# Patient Record
Sex: FEMALE | Race: BLACK OR AFRICAN AMERICAN | Employment: FULL TIME | URBAN - METROPOLITAN AREA
[De-identification: names, ages, dates, MRNs, and addresses within clinical notes are randomized per-mention and may not be internally consistent; named-entity substitution may affect disease eponyms.]

---

## 2021-04-15 ENCOUNTER — HOSPITAL ENCOUNTER (EMERGENCY)
Facility: CLINIC | Age: 46
Discharge: HOME | End: 2021-04-15
Attending: EMERGENCY MEDICINE | Admitting: EMERGENCY MEDICINE
Payer: COMMERCIAL

## 2021-04-15 VITALS
WEIGHT: 165 LBS | OXYGEN SATURATION: 98 % | TEMPERATURE: 98.2 F | SYSTOLIC BLOOD PRESSURE: 123 MMHG | RESPIRATION RATE: 16 BRPM | HEART RATE: 84 BPM | DIASTOLIC BLOOD PRESSURE: 79 MMHG | BODY MASS INDEX: 23.01 KG/M2

## 2021-04-15 DIAGNOSIS — R42 LIGHTHEADEDNESS: ICD-10-CM

## 2021-04-15 DIAGNOSIS — D64.9 SYMPTOMATIC ANEMIA: ICD-10-CM

## 2021-04-15 LAB
ALBUMIN SERPL-MCNC: 3.7 G/DL (ref 3.4–5)
ALP SERPL-CCNC: 49 U/L (ref 40–150)
ALT SERPL W P-5'-P-CCNC: 19 U/L (ref 0–50)
ANION GAP SERPL CALCULATED.3IONS-SCNC: <1 MMOL/L (ref 3–14)
AST SERPL W P-5'-P-CCNC: 13 U/L (ref 0–45)
BASOPHILS # BLD AUTO: 0 10E9/L (ref 0–0.2)
BASOPHILS NFR BLD AUTO: 0 %
BILIRUB SERPL-MCNC: 0.5 MG/DL (ref 0.2–1.3)
BUN SERPL-MCNC: 8 MG/DL (ref 7–30)
CALCIUM SERPL-MCNC: 8.4 MG/DL (ref 8.5–10.1)
CHLORIDE SERPL-SCNC: 109 MMOL/L (ref 94–109)
CO2 SERPL-SCNC: 30 MMOL/L (ref 20–32)
CREAT SERPL-MCNC: 0.59 MG/DL (ref 0.52–1.04)
DIFFERENTIAL METHOD BLD: ABNORMAL
EOSINOPHIL # BLD AUTO: 0.1 10E9/L (ref 0–0.7)
EOSINOPHIL NFR BLD AUTO: 4 %
ERYTHROCYTE [DISTWIDTH] IN BLOOD BY AUTOMATED COUNT: 17.5 % (ref 10–15)
GFR SERPL CREATININE-BSD FRML MDRD: >90 ML/MIN/{1.73_M2}
GLUCOSE SERPL-MCNC: 119 MG/DL (ref 70–99)
HCT VFR BLD AUTO: 29.1 % (ref 35–47)
HGB BLD-MCNC: 9 G/DL (ref 11.7–15.7)
INTERPRETATION ECG - MUSE: NORMAL
LYMPHOCYTES # BLD AUTO: 1.9 10E9/L (ref 0.8–5.3)
LYMPHOCYTES NFR BLD AUTO: 59 %
MCH RBC QN AUTO: 25 PG (ref 26.5–33)
MCHC RBC AUTO-ENTMCNC: 30.9 G/DL (ref 31.5–36.5)
MCV RBC AUTO: 81 FL (ref 78–100)
MONOCYTES # BLD AUTO: 0.3 10E9/L (ref 0–1.3)
MONOCYTES NFR BLD AUTO: 10 %
NEUTROPHILS # BLD AUTO: 0.9 10E9/L (ref 1.6–8.3)
NEUTROPHILS NFR BLD AUTO: 27 %
PLATELET # BLD AUTO: 231 10E9/L (ref 150–450)
PLATELET # BLD EST: ABNORMAL 10*3/UL
POTASSIUM SERPL-SCNC: 3.5 MMOL/L (ref 3.4–5.3)
PROT SERPL-MCNC: 7.3 G/DL (ref 6.8–8.8)
RBC # BLD AUTO: 3.6 10E12/L (ref 3.8–5.2)
RBC MORPH BLD: ABNORMAL
SODIUM SERPL-SCNC: 139 MMOL/L (ref 133–144)
TROPONIN I SERPL-MCNC: <0.015 UG/L (ref 0–0.04)
WBC # BLD AUTO: 3.2 10E9/L (ref 4–11)

## 2021-04-15 PROCEDURE — 258N000003 HC RX IP 258 OP 636: Performed by: EMERGENCY MEDICINE

## 2021-04-15 PROCEDURE — 93005 ELECTROCARDIOGRAM TRACING: CPT

## 2021-04-15 PROCEDURE — 99284 EMERGENCY DEPT VISIT MOD MDM: CPT | Mod: 25

## 2021-04-15 PROCEDURE — 80053 COMPREHEN METABOLIC PANEL: CPT | Performed by: EMERGENCY MEDICINE

## 2021-04-15 PROCEDURE — 85025 COMPLETE CBC W/AUTO DIFF WBC: CPT | Performed by: EMERGENCY MEDICINE

## 2021-04-15 PROCEDURE — 84484 ASSAY OF TROPONIN QUANT: CPT | Performed by: EMERGENCY MEDICINE

## 2021-04-15 PROCEDURE — 96360 HYDRATION IV INFUSION INIT: CPT

## 2021-04-15 RX ORDER — FERROUS SULFATE 325(65) MG
325 TABLET, DELAYED RELEASE (ENTERIC COATED) ORAL DAILY
Qty: 30 TABLET | Refills: 0 | Status: SHIPPED | OUTPATIENT
Start: 2021-04-15 | End: 2021-09-29

## 2021-04-15 RX ADMIN — SODIUM CHLORIDE 1000 ML: 9 INJECTION, SOLUTION INTRAVENOUS at 21:14

## 2021-04-15 ASSESSMENT — ENCOUNTER SYMPTOMS
BLOOD IN STOOL: 0
LIGHT-HEADEDNESS: 1
NAUSEA: 1
FATIGUE: 1
ABDOMINAL PAIN: 1

## 2021-04-15 NOTE — LETTER
April 15, 2021      To Whom It May Concern:      Steve Zurita was seen in our Emergency Department today, 04/15/21.  I expect her condition to improve over the next 2 days.  She may return to work/school when improved.    Sincerely,        Lester Carroll, DO

## 2021-04-16 NOTE — DISCHARGE INSTRUCTIONS
Take iron supplement daily as prescribed  Recommend MiraLAX or daily stool softener to prevent constipation  Maintain adequate hydration

## 2021-04-16 NOTE — ED PROVIDER NOTES
History     Chief Complaint:  Fatigue and light headedness       HPI  Steve Zurita is a 45 year old female  who presents for evaluation of fatigue and light headedness. The patient reports that she got her menstrual cycle last Sunday (4/4/21) and ended on 4/11/21. Her menstrual cycle is typically regular and heavy. Her last menstrual cycle put her out of work from her symptoms. Two days ago the patient became light headed with associated fatigue and nausea. She also notes that she has some abdominal pain. She denies any black or tarry stools, blood in stool, swelling in her legs, taking any blood thinners or iron supplements. The patient denies any concern for pregnancy.      Review of Systems   Constitutional: Positive for fatigue.   Cardiovascular: Negative for leg swelling.   Gastrointestinal: Positive for abdominal pain and nausea. Negative for blood in stool.        No black or tarry stools   Neurological: Positive for light-headedness.   All other systems reviewed and are negative.      Allergies:  No Known Drug Allergies    Medications:   Albuterol  Flagyl     Medical History:   Mild intermittent asthma  Anemia  Hematochezia    Past Surgical History:    ENT surgery   Gyn surgery     Social History:  The patient was unaccompanied to the ED.     Physical Exam     Patient Vitals for the past 24 hrs:   BP Temp Temp src Pulse Resp SpO2 Weight   04/15/21 1936 123/79 98.2  F (36.8  C) Temporal 84 16 98 % 74.8 kg (165 lb)      Physical Exam   General: Alert and cooperative with exam. Patient in mild distress. Normal mentation.  Head:  Scalp is NC/AT  Eyes:  No scleral icterus, PERRL  ENT:  The external nose and ears are normal. The oropharynx is normal and without erythema; mucus membranes are moist. Uvula midline, no evidence of deep space infection.  Neck:  Normal range of motion without rigidity.  CV:  Regular rate and rhythm    No pathologic murmur   Resp:  Breath sounds are clear  bilaterally    Non-labored, no retractions or accessory muscle use  GI:  Abdomen is soft, no distension, no tenderness. No peritoneal signs  MS:  No lower extremity edema   Skin:  Warm and dry, No rash or lesions noted.  Neuro: Oriented x 3. No gross motor deficits.  Emergency Department Course   ECG  ECG taken at 1939, ECG read at 1950  Normal sinus rhythm  Normal ECG   Rate 80 bpm. MD interval 136 ms. QRS duration 84 ms. QT/QTc 362/417 ms. P-R-T axes 77 63 41.     Laboratory:  CBC: WBC 3.2 (L), HGB 9.0 (L),   CMP: glucose 119 (H), anion gap <1 (L), calcium 8.4 (L) o/w WNL (Creatinine 0.59)  Troponin (Collected 1948): <0.015    Emergency Department Course:  Reviewed:  2114 I reviewed nursing notes, vitals, past medical history and care everywhere    Assessments:  2145 I rechecked the patient and explained findings.     2201 Patient rechecked and updated.      Interventions:  2114 0.9% NS 1000 mL IV    Disposition:  The patient was discharged to home.   Impression & Plan   Medical Decision Making:  Steve Zurita is a 45 year old female who presents for evaluation of light headedness and fatigue.  They are anemic on labs.  A broad differential was of course considered, most probable etiologies include anemia of chronic disease, genetic disorder with abnormal hemoglobin, acute blood loss, slow or fast gastrointestinal bleeding, iron deficiency anemia, etc.  Given exam, history and labs, I feel the most likely etiology of her anemia is reportedly heavy periods.  Patient has no current ongoing vaginal bleeding.  Lungs are clear to auscultation there is no evidence of infectious process.  EKG shows normal sinus rhythm and remainder of labs are essentially unremarkable as noted above.    Based on hgb, I recommended outpatient management, iron supplementation (prescription provided), and close follow up with primary. Return for worsening symptoms, progressive dyspnea, blood in stools, vomiting blood, black  stools.      Diagnosis:     ICD-10-CM    1. Symptomatic anemia  D64.9    2. Lightheadedness  R42         Discharge Medications:  New Prescriptions    FERROUS SULFATE (FE TABS) 325 (65 FE) MG EC TABLET    Take 1 tablet (325 mg) by mouth daily       Scribe Disclosure:  I, Sasha Early, am serving as a scribe at 9:14 PM on 4/15/2021 to document services personally performed by Lester Howard DO based on my observations and the provider's statements to me.     [facility] Lester Aguilar DO  04/15/21 [id number]

## 2021-04-16 NOTE — ED TRIAGE NOTES
Pt has had intermittent chest pain intermittently throughout the last 3 weeks.  States that it feels sharp.

## 2021-07-21 ENCOUNTER — OFFICE VISIT (OUTPATIENT)
Dept: URGENT CARE | Facility: URGENT CARE | Age: 46
End: 2021-07-21
Payer: COMMERCIAL

## 2021-07-21 VITALS
TEMPERATURE: 97 F | HEART RATE: 88 BPM | DIASTOLIC BLOOD PRESSURE: 73 MMHG | WEIGHT: 170 LBS | SYSTOLIC BLOOD PRESSURE: 122 MMHG | OXYGEN SATURATION: 99 % | BODY MASS INDEX: 23.71 KG/M2

## 2021-07-21 DIAGNOSIS — N89.8 VAGINAL DISCHARGE: Primary | ICD-10-CM

## 2021-07-21 DIAGNOSIS — N76.0 BV (BACTERIAL VAGINOSIS): ICD-10-CM

## 2021-07-21 DIAGNOSIS — B96.89 BV (BACTERIAL VAGINOSIS): ICD-10-CM

## 2021-07-21 DIAGNOSIS — B37.31 CANDIDIASIS OF VAGINA: ICD-10-CM

## 2021-07-21 LAB
ALBUMIN UR-MCNC: NEGATIVE MG/DL
APPEARANCE UR: CLEAR
BACTERIA #/AREA URNS HPF: ABNORMAL /HPF
BILIRUB UR QL STRIP: NEGATIVE
CLUE CELLS: PRESENT
COLOR UR AUTO: YELLOW
GLUCOSE UR STRIP-MCNC: NEGATIVE MG/DL
HGB UR QL STRIP: NEGATIVE
KETONES UR STRIP-MCNC: ABNORMAL MG/DL
LEUKOCYTE ESTERASE UR QL STRIP: ABNORMAL
NITRATE UR QL: NEGATIVE
PH UR STRIP: 6.5 [PH] (ref 5–7)
RBC #/AREA URNS AUTO: ABNORMAL /HPF
SP GR UR STRIP: >=1.03 (ref 1–1.03)
TRICHOMONAS, WET PREP: ABNORMAL
UROBILINOGEN UR STRIP-ACNC: 2 E.U./DL
WBC #/AREA URNS AUTO: ABNORMAL /HPF
WBC'S/HIGH POWER FIELD, WET PREP: ABNORMAL
YEAST, WET PREP: PRESENT

## 2021-07-21 PROCEDURE — 87210 SMEAR WET MOUNT SALINE/INK: CPT

## 2021-07-21 PROCEDURE — 99203 OFFICE O/P NEW LOW 30 MIN: CPT | Performed by: NURSE PRACTITIONER

## 2021-07-21 PROCEDURE — 81001 URINALYSIS AUTO W/SCOPE: CPT

## 2021-07-21 RX ORDER — METRONIDAZOLE 500 MG/1
500 TABLET ORAL 2 TIMES DAILY
Qty: 14 TABLET | Refills: 0 | Status: SHIPPED | OUTPATIENT
Start: 2021-07-21 | End: 2021-07-28

## 2021-07-21 RX ORDER — FLUCONAZOLE 150 MG/1
150 TABLET ORAL ONCE
Qty: 1 TABLET | Refills: 1 | Status: SHIPPED | OUTPATIENT
Start: 2021-07-21 | End: 2021-07-21

## 2021-07-22 NOTE — PATIENT INSTRUCTIONS
Patient Education     Bacterial Vaginosis    You have a vaginal infection called bacterial vaginosis (BV). Both good and bad bacteria are present in a healthy vagina. BV occurs when these bacteria get out of balance. The number of bad bacteria increase. And the number of good bacteria decrease. BV is linked with sexual activity, but it's not a sexually transmitted infection (STI).   BV may or may not cause symptoms. If symptoms do occur, they can include:     Thin, gray, milky-white, or sometimes green discharge    Unpleasant odor or  fishy  smell    Itching, burning, or pain in or around the vagina  It is not known what causes BV, but certain factors can make the problem more likely. These can include:     Douching    Spermicides    Use of antibiotics    Change in hormone levels with pregnancy, breastfeeding, or menopause    Having sex with a new partner    Having sex with more than one partner  BV will sometimes go away on its own. But treatment is often advised. This is because untreated BV can raise the risk of more serious health problems such as:     Pelvic inflammatory disease (PID)     delivery (giving birth to a baby early if you re pregnant)    HIV and some other sexually transmitted infections (STIs)    Infection after surgery on the reproductive organs  Home care  General care    BV is most often treated with medicines called antibiotics. These may be given as pills or as a vaginal cream. If antibiotics are prescribed, be sure to use them exactly as directed. And complete all of the medicine, even if your symptoms go away.    Don't douche or having sex during treatment.    If you have sex with a female partner, ask your healthcare provider if she should also be treated.  Prevention    Don't douche.    Don't have sex. If you do have sex, then take steps to lower your risk:  ? Use condoms when having sex.  ? Limit the number of sex partners you have.    Follow-up care  Follow up with your  healthcare provider, or as advised.   When to get medical advice  Call your healthcare provider right away if:     You have a fever of 100.4 F (38 C) or higher, or as directed by your provider.    Your symptoms get worse, or they don t go away within a few days of starting treatment.    You have new pain in the lower belly or pelvic region.    You have side effects that bother you or a reaction to the pills or cream you re prescribed.    You or any of your sex partners have new symptoms, such as a rash, joint pain, or sores.  [employer] last reviewed this educational content on 6/1/2020 2000-2021 [employer]. All rights reserved. This information is not intended as a substitute for professional medical care. Always follow your healthcare professional's instructions.           Patient Education     Yeast Infection (Candida Vaginal Infection)    You have a Candida vaginal infection. This is also known as a yeast infection. It's most often caused by a type of yeast (fungus) called Candida. Candida are normally found in the vagina. But if they increase in number, this can lead to infection and cause symptoms.   Symptoms of a yeast infection can include:     Clumpy or thin, white discharge, which may look like cottage cheese    Itching or burning    Burning with urination  Certain factors can make a yeast infection more likely. These can include:     Taking certain medicines, such as antibiotics or birth control pills    Pregnancy    Diabetes    Weak immune system  A yeast infection is most often treated with antifungal medicine. This may be given as a vaginal cream or pills you take by mouth. Treatment may last for about 1 to 7 days. Women with severe or recurrent infections may need longer courses of treatment.   Home care    If you re prescribed medicine, be sure to use it as directed. Finish all of the medicine, even if your symptoms go away. Don t try to treat yourself using over-the-counter products  without talking with your provider first. They will let you know if this is a good option for you.    Ask your provider what steps you can take to help reduce your risk of having a yeast infection in the future.    Follow-up care  Follow up with your healthcare provider, or as directed.   When to seek medical advice  Call your healthcare provider right away if:     You have a fever of 100.4 F (38 C) or higher, or as directed by your provider.    Your symptoms worsen, or they don t go away within a few days of starting treatment.    You have new pain in the lower belly or pelvic region.    You have side effects that bother you or a reaction to the cream or pills you re prescribed.    You or any partners you have sex with have new symptoms, such as a rash, joint pain, or sores.  [employer] last reviewed this educational content on 7/1/2020 2000-2021 [employer]. All rights reserved. This information is not intended as a substitute for professional medical care. Always follow your healthcare professional's instructions.

## 2021-07-22 NOTE — PROGRESS NOTES
Assessment & Plan     Vaginal discharge    - UA Macro with Reflex to Micro and Culture - lab collect  - Wet prep - lab collect  - Urine Microscopic    BV (bacterial vaginosis)    - metroNIDAZOLE (FLAGYL) 500 MG tablet; Take 1 tablet (500 mg) by mouth 2 times daily for 7 days    Candidiasis of vagina    - fluconazole (DIFLUCAN) 150 MG tablet; Take 1 tablet (150 mg) by mouth once for 1 dose      15 minutes spent on the date of the encounter doing review of test results, interpretation of tests, patient visit and documentation        See Patient Instructions    Return in about 1 week (around 7/28/2021), or if symptoms worsen or fail to improve.    Paulette Mendez, CNP  [facility] JANNET Wilson is a 45 year old who presents for the following health issues     HPI     Vaginal Symptoms  Onset/Duration: 1 week  Description:  Vaginal Discharge: white   Itching (Pruritis): YES  Burning sensation:  YES  Odor: no  Accompanying Signs & Symptoms:  Urinary symptoms: no  Abdominal pain: no  Fever: no  History:   Sexually active: YES  New Partner: YES  Possibility of Pregnancy:  no  Recent antibiotic use: no  Previous vaginitis issues: no  Precipitating or alleviating factors: None  Therapies tried and outcome: none          Review of Systems   Constitutional, HEENT, cardiovascular, pulmonary, gi and gu systems are negative, except as otherwise noted.      Objective    /73 (BP Location: Left arm, Patient Position: Sitting, Cuff Size: Adult Regular)   Pulse 88   Temp 97  F (36.1  C) (Tympanic)   Wt 77.1 kg (170 lb)   SpO2 99%   Breastfeeding No   BMI 23.71 kg/m    Body mass index is 23.71 kg/m .  Physical Exam   GENERAL: healthy, alert and no distress  NECK: no adenopathy, no asymmetry, masses, or scars and thyroid normal to palpation  RESP: lungs clear to auscultation - no rales, rhonchi or wheezes  CV: regular rate and rhythm, normal S1 S2, no S3 or S4, no murmur, click or  rub, no peripheral edema and peripheral pulses strong  ABDOMEN: soft, nontender, no hepatosplenomegaly, no masses and bowel sounds normal  MS: no gross musculoskeletal defects noted, no edema    Results for orders placed or performed in visit on 07/21/21   UA Macro with Reflex to Micro and Culture - lab collect     Status: Abnormal    Specimen: Urine, Midstream   Result Value Ref Range    Color Urine Yellow Colorless, Straw, Light Yellow, Yellow    Appearance Urine Clear Clear    Glucose Urine Negative Negative mg/dL    Bilirubin Urine Negative Negative    Ketones Urine Trace (A) Negative mg/dL    Specific Gravity Urine >=1.030 1.003 - 1.035    Blood Urine Negative Negative    pH Urine 6.5 5.0 - 7.0    Protein Albumin Urine Negative Negative mg/dL    Urobilinogen Urine 2.0 (A) 0.2, 1.0 E.U./dL    Nitrite Urine Negative Negative    Leukocyte Esterase Urine Small (A) Negative   Urine Microscopic     Status: Abnormal   Result Value Ref Range    Bacteria Urine Many (A) None Seen /HPF    RBC Urine 0-2 0-2 /HPF /HPF    WBC Urine 0-5 0-5 /HPF /HPF    Narrative    Urine Culture not indicated   Wet prep - lab collect     Status: Abnormal    Specimen: Vagina; Swab   Result Value Ref Range    Trichomonas Absent Absent    Yeast Present (A) Absent    Clue Cells Present (A) Absent    WBCs/high power field 4+ (A) None

## 2021-08-06 ENCOUNTER — OFFICE VISIT (OUTPATIENT)
Dept: URGENT CARE | Facility: URGENT CARE | Age: 46
End: 2021-08-06
Payer: COMMERCIAL

## 2021-08-06 VITALS
OXYGEN SATURATION: 99 % | HEART RATE: 87 BPM | BODY MASS INDEX: 23.57 KG/M2 | DIASTOLIC BLOOD PRESSURE: 85 MMHG | SYSTOLIC BLOOD PRESSURE: 124 MMHG | WEIGHT: 169 LBS | TEMPERATURE: 98.4 F

## 2021-08-06 DIAGNOSIS — N76.0 BACTERIAL VAGINITIS: ICD-10-CM

## 2021-08-06 DIAGNOSIS — B96.89 BACTERIAL VAGINITIS: ICD-10-CM

## 2021-08-06 DIAGNOSIS — B37.31 CANDIDIASIS OF VAGINA: ICD-10-CM

## 2021-08-06 DIAGNOSIS — N94.9 VAGINAL SYMPTOM: Primary | ICD-10-CM

## 2021-08-06 LAB
ALBUMIN UR-MCNC: NEGATIVE MG/DL
APPEARANCE UR: CLEAR
BACTERIA #/AREA URNS HPF: ABNORMAL /HPF
BILIRUB UR QL STRIP: NEGATIVE
CLUE CELLS: PRESENT
COLOR UR AUTO: YELLOW
GLUCOSE UR STRIP-MCNC: NEGATIVE MG/DL
HGB UR QL STRIP: NEGATIVE
KETONES UR STRIP-MCNC: ABNORMAL MG/DL
LEUKOCYTE ESTERASE UR QL STRIP: ABNORMAL
NITRATE UR QL: NEGATIVE
PH UR STRIP: 7 [PH] (ref 5–7)
RBC #/AREA URNS AUTO: ABNORMAL /HPF
SP GR UR STRIP: 1.02 (ref 1–1.03)
TRICHOMONAS, WET PREP: ABNORMAL
UROBILINOGEN UR STRIP-ACNC: 1 E.U./DL
WBC #/AREA URNS AUTO: ABNORMAL /HPF
WBC'S/HIGH POWER FIELD, WET PREP: ABNORMAL
YEAST, WET PREP: PRESENT

## 2021-08-06 PROCEDURE — 87210 SMEAR WET MOUNT SALINE/INK: CPT | Performed by: STUDENT IN AN ORGANIZED HEALTH CARE EDUCATION/TRAINING PROGRAM

## 2021-08-06 PROCEDURE — 81001 URINALYSIS AUTO W/SCOPE: CPT | Performed by: STUDENT IN AN ORGANIZED HEALTH CARE EDUCATION/TRAINING PROGRAM

## 2021-08-06 PROCEDURE — 87591 N.GONORRHOEAE DNA AMP PROB: CPT | Performed by: STUDENT IN AN ORGANIZED HEALTH CARE EDUCATION/TRAINING PROGRAM

## 2021-08-06 PROCEDURE — 87491 CHLMYD TRACH DNA AMP PROBE: CPT | Performed by: STUDENT IN AN ORGANIZED HEALTH CARE EDUCATION/TRAINING PROGRAM

## 2021-08-06 PROCEDURE — 99213 OFFICE O/P EST LOW 20 MIN: CPT | Performed by: STUDENT IN AN ORGANIZED HEALTH CARE EDUCATION/TRAINING PROGRAM

## 2021-08-06 RX ORDER — METRONIDAZOLE 500 MG/1
500 TABLET ORAL 2 TIMES DAILY
Qty: 14 TABLET | Refills: 0 | Status: SHIPPED | OUTPATIENT
Start: 2021-08-06 | End: 2021-08-13

## 2021-08-06 RX ORDER — FLUCONAZOLE 150 MG/1
150 TABLET ORAL DAILY
Qty: 3 TABLET | Refills: 0 | Status: SHIPPED | OUTPATIENT
Start: 2021-08-06 | End: 2021-08-09

## 2021-08-06 NOTE — PROGRESS NOTES
Assessment & Plan     Vaginal symptom  - UA reflex to Microscopic and Culture  - Wet preparation  - Neisseria gonorrhoeae PCR - Clinic Collect  - Chlamydia trachomatis PCR - Clinic Collect  - Urine Microscopic    Bacterial vaginitis  - metroNIDAZOLE (FLAGYL) 500 MG tablet  Dispense: 14 tablet; Refill: 0    Candidiasis of vagina  Suspect recurrent yeast infection in setting of PO antibiotics for BV. Wet prep still shows yeast and clue cells.  - fluconazole (DIFLUCAN) 150 MG tablet  Dispense: 3 tablet; Refill: 0        Return if symptoms worsen or fail to improve.    Opal Nunes MD  [facility] URGENT CARE JANNET Wilson is a 45 year old female who presents to clinic today for the following health issues:  Chief Complaint   Patient presents with     Urgent Care     Vaginal Problem     was seen and treated here on 7/21 for yeast infection, lots of itching. wants to be checked for std.     HPI    GYN Complaint  Vaginal itching with scant amount of discharge  Onset of symptoms was 5 day(s) ago.  Got better and then worse again - now itching is the biggest symptom and less vaginal discharge this time  Course of illness is worsening.    Severity moderate  Current and Associated symptoms: local irritation and vulvar itching  No rash  Treatment measures tried include: was treated for yeast infection with fluconazole x1 and treated for BC with metronidazole PO x 7d  Sexually active: yes  Predisposing factors: None  Hx of previous symptom: rare        Review of Systems  Constitutional,  gi and gu systems are negative, except as otherwise noted.      Objective    /85   Pulse 87   Temp 98.4  F (36.9  C) (Tympanic)   Wt 76.7 kg (169 lb)   SpO2 99%   BMI 23.57 kg/m    Physical Exam   GEN: Alert and appropriately interactive for age  EYES: Eyes grossly normal to inspection, conjunctivae and sclerae normal  RESP: Breathing comfortably on room air   CV: Warm and well perfused peripheral  extremities   MS: no gross musculoskeletal defects noted, no edema  NEURO: Alert and oriented x 4. Gait normal. Speech fluent.    PSYCH: Affect normal/bright. Appearance well groomed.       Results for orders placed or performed in visit on 08/06/21 (from the past 24 hour(s))   UA reflex to Microscopic and Culture    Specimen: Urine, Midstream   Result Value Ref Range    Color Urine Yellow Colorless, Straw, Light Yellow, Yellow    Appearance Urine Clear Clear    Glucose Urine Negative Negative mg/dL    Bilirubin Urine Negative Negative    Ketones Urine Trace (A) Negative mg/dL    Specific Gravity Urine 1.025 1.003 - 1.035    Blood Urine Negative Negative    pH Urine 7.0 5.0 - 7.0    Protein Albumin Urine Negative Negative mg/dL    Urobilinogen Urine 1.0 0.2, 1.0 E.U./dL    Nitrite Urine Negative Negative    Leukocyte Esterase Urine Small (A) Negative   Urine Microscopic   Result Value Ref Range    Bacteria Urine Many (A) None Seen /HPF    RBC Urine 0-2 0-2 /HPF /HPF    WBC Urine 0-5 0-5 /HPF /HPF    Narrative    Urine Culture not indicated   Wet preparation    Specimen: Vagina; Swab   Result Value Ref Range    Trichomonas Absent Absent    Yeast Present (A) Absent    Clue Cells Present (A) Absent    WBCs/high power field 4+ (A) None

## 2021-08-08 LAB
C TRACH DNA SPEC QL NAA+PROBE: NEGATIVE
N GONORRHOEA DNA SPEC QL NAA+PROBE: NEGATIVE

## 2021-08-29 ENCOUNTER — HEALTH MAINTENANCE LETTER (OUTPATIENT)
Age: 46
End: 2021-08-29

## 2021-09-29 ENCOUNTER — OFFICE VISIT (OUTPATIENT)
Dept: URGENT CARE | Facility: URGENT CARE | Age: 46
End: 2021-09-29
Payer: COMMERCIAL

## 2021-09-29 VITALS
HEART RATE: 64 BPM | DIASTOLIC BLOOD PRESSURE: 80 MMHG | OXYGEN SATURATION: 100 % | TEMPERATURE: 98.1 F | SYSTOLIC BLOOD PRESSURE: 123 MMHG

## 2021-09-29 DIAGNOSIS — N76.0 BV (BACTERIAL VAGINOSIS): ICD-10-CM

## 2021-09-29 DIAGNOSIS — B96.89 BV (BACTERIAL VAGINOSIS): ICD-10-CM

## 2021-09-29 DIAGNOSIS — Z11.3 SCREEN FOR STD (SEXUALLY TRANSMITTED DISEASE): Primary | ICD-10-CM

## 2021-09-29 LAB
CLUE CELLS: PRESENT
TRICHOMONAS, WET PREP: ABNORMAL
WBC'S/HIGH POWER FIELD, WET PREP: ABNORMAL
YEAST, WET PREP: ABNORMAL

## 2021-09-29 PROCEDURE — 87591 N.GONORRHOEAE DNA AMP PROB: CPT | Performed by: NURSE PRACTITIONER

## 2021-09-29 PROCEDURE — 99213 OFFICE O/P EST LOW 20 MIN: CPT

## 2021-09-29 PROCEDURE — 87210 SMEAR WET MOUNT SALINE/INK: CPT

## 2021-09-29 PROCEDURE — 87491 CHLMYD TRACH DNA AMP PROBE: CPT | Performed by: NURSE PRACTITIONER

## 2021-09-29 RX ORDER — CLINDAMYCIN PHOSPHATE 20 MG/G
CREAM VAGINAL AT BEDTIME
Qty: 40 G | Refills: 0 | Status: SHIPPED | OUTPATIENT
Start: 2021-09-29 | End: 2021-10-04

## 2021-09-29 NOTE — PROGRESS NOTES
Assessment & Plan     Screen for STD (sexually transmitted disease)  No trichomoniasis seen on wet prep.  GC chlamydia still pending    - Chlamydia & Gonorrhea by PCR, G[facility]/Range - Clinic Collect  - Wet preparation    BV (bacterial vaginosis)  Recurrent BV infection over the past 3 months.  Last 2 times were treated with metronidazole by mouth.  We will try clindamycin vaginal inserts, and have her see GYN.  - clindamycin (CLEOCIN) 2 % vaginal cream; Place vaginally At Bedtime for 5 days  - Ob/Gyn Referral; Future      20 minutes spent on the date of the encounter doing chart review, review of test results, patient visit and documentation        See Patient Instructions    Return in about 1 week (around 10/6/2021), or if symptoms worsen or fail to improve, for Follow up GYN for recurrent BV infections. .    CS Urgent Care Provider  [facility] URGENT CARE JANNET Wilson is a 46 year old who presents for the following health issues     HPI     46-year-old female presents to urgent care for an STI check.  Was told she was exposed to trichomonas.  Does not use condoms, is asymptomatic.  Upon review of her chart, she has been treated for BV twice in the past 2 months.    Review of Systems   Constitutional, HEENT, cardiovascular, pulmonary, gi and gu systems are negative, except as otherwise noted.      Objective    /80 (BP Location: Right arm, Patient Position: Sitting, Cuff Size: Adult Regular)   Pulse 64   Temp 98.1  F (36.7  C) (Oral)   SpO2 100%   Breastfeeding No   There is no height or weight on file to calculate BMI.  Physical Exam   GENERAL: healthy, alert and no distress    Results for orders placed or performed in visit on 09/29/21   Wet preparation     Status: Abnormal    Specimen: Vagina; Swab   Result Value Ref Range    Trichomonas Absent Absent    Yeast Absent Absent    Clue Cells Present (A) Absent    WBCs/high power field 4+ (A) None

## 2021-10-01 LAB
C TRACH DNA SPEC QL PROBE+SIG AMP: NEGATIVE
N GONORRHOEA DNA SPEC QL NAA+PROBE: NEGATIVE

## 2021-10-24 ENCOUNTER — HEALTH MAINTENANCE LETTER (OUTPATIENT)
Age: 46
End: 2021-10-24

## 2022-02-13 ENCOUNTER — HEALTH MAINTENANCE LETTER (OUTPATIENT)
Age: 47
End: 2022-02-13

## 2022-03-16 ENCOUNTER — OFFICE VISIT (OUTPATIENT)
Dept: FAMILY MEDICINE | Facility: CLINIC | Age: 47
End: 2022-03-16
Payer: COMMERCIAL

## 2022-03-16 VITALS
OXYGEN SATURATION: 100 % | SYSTOLIC BLOOD PRESSURE: 120 MMHG | RESPIRATION RATE: 14 BRPM | TEMPERATURE: 98.5 F | DIASTOLIC BLOOD PRESSURE: 62 MMHG | BODY MASS INDEX: 23.01 KG/M2 | WEIGHT: 165 LBS | HEART RATE: 106 BPM

## 2022-03-16 DIAGNOSIS — R07.89 CHEST WALL PAIN: ICD-10-CM

## 2022-03-16 DIAGNOSIS — M17.10 PATELLOFEMORAL ARTHROSIS: Primary | ICD-10-CM

## 2022-03-16 PROCEDURE — 99214 OFFICE O/P EST MOD 30 MIN: CPT | Performed by: FAMILY MEDICINE

## 2022-03-16 RX ORDER — CELECOXIB 100 MG/1
100 CAPSULE ORAL 2 TIMES DAILY
Qty: 30 CAPSULE | Refills: 1 | Status: SHIPPED | OUTPATIENT
Start: 2022-03-16

## 2022-03-16 ASSESSMENT — PAIN SCALES - GENERAL: PAINLEVEL: NO PAIN (0)

## 2022-03-16 NOTE — PROGRESS NOTES
Assessment & Plan     Patellofemoral arthrosis  Has been worsening with repetitive movement on knee for last several years, has no morning stiffness, will have her to try PT and MORGAN-2I  - celecoxib (CELEBREX) 100 MG capsule; Take 1 capsule (100 mg) by mouth 2 times daily  - Physical Therapy Referral; Future    Chest wall pain  Has reproducible point tenderness on left 5th/6th/7th sternochondral junction, will have her to try MORGAN-2I  - celecoxib (CELEBREX) 100 MG capsule; Take 1 capsule (100 mg) by mouth 2 times daily           FUTURE APPOINTMENTS:       - Follow-up visit in 2 months if not improving     No follow-ups on file.    Pan Cole MD  [facility] NAHID Wilson is a 46 year old who presents for the following health issues     Knee Pain    History of Present Illness       Reason for visit:  Chest and knee pain  Symptom onset:  1-3 days ago    She eats 2-3 servings of fruits and vegetables daily.She consumes 1 sweetened beverage(s) daily.She exercises with enough effort to increase her heart rate 9 or less minutes per day.  She exercises with enough effort to increase her heart rate 3 or less days per week.   She is taking medications regularly.       Review of Systems   Constitutional, HEENT, cardiovascular, pulmonary, gi and gu systems are negative, except as otherwise noted.      Objective    /62   Pulse 106   Temp 98.5  F (36.9  C) (Tympanic)   Resp 14   Wt 74.8 kg (165 lb)   LMP 03/02/2022   SpO2 100%   BMI 23.01 kg/m    Body mass index is 23.01 kg/m .  Physical Exam   GENERAL: healthy, alert and no distress  EYES: Eyes grossly normal to inspection, PERRL and conjunctivae and sclerae normal  HENT: ear canals and TM's normal, nose and mouth without ulcers or lesions  NECK: no adenopathy, no asymmetry, masses, or scars and thyroid normal to palpation  RESP: lungs clear to auscultation - no rales, rhonchi or wheezes  CV: regular rate and rhythm, normal  S1 S2, no S3 or S4, no murmur, click or rub, no peripheral edema and peripheral pulses strong  ABDOMEN: soft, nontender, no hepatosplenomegaly, no masses and bowel sounds normal  MS: no gross musculoskeletal defects noted, no edema

## 2022-04-27 ENCOUNTER — TELEPHONE (OUTPATIENT)
Dept: FAMILY MEDICINE | Facility: CLINIC | Age: 47
End: 2022-04-27
Payer: COMMERCIAL

## 2022-04-27 NOTE — LETTER
April 27, 2022      Steve Zurita  [address]  [place] [address]        Dear Steve,    I care about your health and have reviewed your health plan. I have reviewed your medical conditions, medication list, and lab results and am making recommendations based on this review, to better manage your health.    You are in particular need of attention regarding:  -Cervical Cancer Screening    I am recommending that you:  -schedule a PAP SMEAR EXAM which is due.  Please disregard this reminder if you have had this exam elsewhere within the last year.  It would be helpful for us to have a copy of your recent pap smear report in our file so that we can best coordinate your care.    Here is a list of Health Maintenance topics that are due now or due soon:  Health Maintenance Due   Topic Date Due     Preventive Care Visit  Never done     ANNUAL REVIEW OF HM ORDERS  Never done     Discuss Advance Care Planning  Never done     Hepatitis C Screening  Never done     PAP Smear  Never done     Flu Vaccine (1) 09/01/2021     COVID-19 Vaccine (3 - Booster for Moderna series) 02/15/2022       Please call us at [phone] (or use [employer]) to address the above recommendations.     Thank you for trusting [facility] and we appreciate the opportunity to serve you.  We look forward to supporting your healthcare needs in the future.    Healthy Regards,    Pan Cole MD

## 2022-04-27 NOTE — TELEPHONE ENCOUNTER
Patient Quality Outreach    Patient is due for the following:   Breast Cancer Screening - Mammogram    NEXT STEPS:       Type of outreach:    Sent letter.      Questions for provider review:         Hetal Tavera CMA

## 2022-10-15 ENCOUNTER — HEALTH MAINTENANCE LETTER (OUTPATIENT)
Age: 47
End: 2022-10-15

## 2023-02-27 ENCOUNTER — TELEPHONE (OUTPATIENT)
Dept: FAMILY MEDICINE | Facility: CLINIC | Age: 48
End: 2023-02-27

## 2023-02-27 NOTE — TELEPHONE ENCOUNTER
Needs of attention regarding:  -Depression  -Breast Cancer Screening  -Colon Cancer Screening  -Cervical Cancer Screening  -Wellness (Physical) Visit     Health Maintenance Topics with due status: Overdue       Topic Date Due    YEARLY PREVENTIVE VISIT Never done    ANNUAL REVIEW OF HM ORDERS Never done    ADVANCE CARE PLANNING Never done    MAMMO SCREENING Never done    COLORECTAL CANCER SCREENING Never done    HEPATITIS C SCREENING Never done    HEPATITIS B IMMUNIZATION 05/18/1998    PAP Never done    COVID-19 Vaccine 11/10/2021    LIPID Never done    INFLUENZA VACCINE 09/01/2022    PHQ-2 (once per calendar year) 01/01/2023       Communication:  Patient called - message left

## 2023-04-03 NOTE — TELEPHONE ENCOUNTER
Needs of attention regarding:  -Breast Cancer Screening  -Colon Cancer Screening  -Cervical Cancer Screening  -Wellness (Physical) Visit     Health Maintenance Topics with due status: Overdue       Topic Date Due    YEARLY PREVENTIVE VISIT Never done    ANNUAL REVIEW OF HM ORDERS Never done    ADVANCE CARE PLANNING Never done    MAMMO SCREENING Never done    COLORECTAL CANCER SCREENING Never done    HEPATITIS C SCREENING Never done    HEPATITIS B IMMUNIZATION 05/18/1998    PAP Never done    COVID-19 Vaccine 11/10/2021    LIPID Never done    INFLUENZA VACCINE 09/01/2022    PHQ-2 (once per calendar year) 01/01/2023       Communication:  See MyChart message

## 2023-10-29 ENCOUNTER — HEALTH MAINTENANCE LETTER (OUTPATIENT)
Age: 48
End: 2023-10-29

## 2024-11-02 ENCOUNTER — OFFICE VISIT (OUTPATIENT)
Dept: URGENT CARE | Facility: URGENT CARE | Age: 49
End: 2024-11-02
Payer: COMMERCIAL

## 2024-11-02 VITALS
RESPIRATION RATE: 18 BRPM | DIASTOLIC BLOOD PRESSURE: 79 MMHG | HEART RATE: 88 BPM | TEMPERATURE: 98.4 F | OXYGEN SATURATION: 100 % | WEIGHT: 155 LBS | SYSTOLIC BLOOD PRESSURE: 120 MMHG

## 2024-11-02 DIAGNOSIS — Z11.3 SCREENING EXAMINATION FOR SEXUALLY TRANSMITTED DISEASE: ICD-10-CM

## 2024-11-02 DIAGNOSIS — A59.9 TRICHOMONAS INFECTION: Primary | ICD-10-CM

## 2024-11-02 DIAGNOSIS — N39.0 URINARY TRACT INFECTION WITHOUT HEMATURIA, SITE UNSPECIFIED: ICD-10-CM

## 2024-11-02 LAB
ALBUMIN UR-MCNC: 30 MG/DL
APPEARANCE UR: ABNORMAL
BACTERIA #/AREA URNS HPF: ABNORMAL /HPF
BILIRUB UR QL STRIP: NEGATIVE
CLUE CELLS: ABNORMAL
COLOR UR AUTO: YELLOW
GLUCOSE UR STRIP-MCNC: NEGATIVE MG/DL
HGB UR QL STRIP: NEGATIVE
KETONES UR STRIP-MCNC: NEGATIVE MG/DL
LEUKOCYTE ESTERASE UR QL STRIP: ABNORMAL
NITRATE UR QL: POSITIVE
PH UR STRIP: 6.5 [PH] (ref 5–7)
RBC #/AREA URNS AUTO: ABNORMAL /HPF
SP GR UR STRIP: 1.02 (ref 1–1.03)
SQUAMOUS #/AREA URNS AUTO: ABNORMAL /LPF
TRICHOMONAS, WET PREP: PRESENT
UROBILINOGEN UR STRIP-ACNC: 0.2 E.U./DL
WBC #/AREA URNS AUTO: ABNORMAL /HPF
WBC'S/HIGH POWER FIELD, WET PREP: ABNORMAL
YEAST, WET PREP: ABNORMAL

## 2024-11-02 PROCEDURE — 81001 URINALYSIS AUTO W/SCOPE: CPT | Performed by: FAMILY MEDICINE

## 2024-11-02 PROCEDURE — 87210 SMEAR WET MOUNT SALINE/INK: CPT | Performed by: FAMILY MEDICINE

## 2024-11-02 PROCEDURE — 87186 SC STD MICRODIL/AGAR DIL: CPT | Performed by: FAMILY MEDICINE

## 2024-11-02 PROCEDURE — 87491 CHLMYD TRACH DNA AMP PROBE: CPT | Performed by: FAMILY MEDICINE

## 2024-11-02 PROCEDURE — 87086 URINE CULTURE/COLONY COUNT: CPT | Performed by: FAMILY MEDICINE

## 2024-11-02 PROCEDURE — 99213 OFFICE O/P EST LOW 20 MIN: CPT | Performed by: FAMILY MEDICINE

## 2024-11-02 RX ORDER — SULFAMETHOXAZOLE AND TRIMETHOPRIM 800; 160 MG/1; MG/1
1 TABLET ORAL 2 TIMES DAILY
Qty: 10 TABLET | Refills: 0 | Status: SHIPPED | OUTPATIENT
Start: 2024-11-02 | End: 2024-11-07

## 2024-11-02 RX ORDER — METRONIDAZOLE 500 MG/1
500 TABLET ORAL 2 TIMES DAILY
Qty: 14 TABLET | Refills: 0 | Status: SHIPPED | OUTPATIENT
Start: 2024-11-02 | End: 2024-11-09

## 2024-11-02 NOTE — PROGRESS NOTES
Assessment & Plan     Trichomonas infection    - Wet prep - Clinic Collect  - metroNIDAZOLE (FLAGYL) 500 MG tablet; Take 1 tablet (500 mg) by mouth 2 times daily for 7 days.    Treat with Flagyl- discussed with patient that this is an STI and she should inform recent sexual partners and recommend treatment for all to prevent spreading back and forth.    Urinary tract infection without hematuria, site unspecified    - UA Macroscopic with reflex to Microscopic and Culture - Lab Collect  - Urine Microscopic Exam  - Urine Culture  - sulfamethoxazole-trimethoprim (BACTRIM DS) 800-160 MG tablet; Take 1 tablet by mouth 2 times daily for 5 days.    Treat with Bactrim.  Continue with increased fluids.  Urine culture for sensitivities.    Screening examination for sexually transmitted disease    - Chlamydia trachomatis PCR - Clinic Collect  - Neisseria gonorrhoeae PCR - Clinic Collect    Will notify patient if + and treatment sent prn.    Pia Romero MD    Jaci Wilson is a 49 year old, presenting for the following health issues:  Vaginal Problem (Thick and whitish-green), UTI, and STD    HPI     Increased vaginal discharge this week.  No fever or flank pain.  No abdominal pain.  Would like STI screening.    Review of Systems  Constitutional, neuro, ENT, endocrine, pulmonary, cardiac, gastrointestinal, genitourinary, musculoskeletal, integument and psychiatric systems are negative, except as otherwise noted.      Objective    /79   Pulse 88   Temp 98.4  F (36.9  C)   Resp 18   Wt 70.3 kg (155 lb)   LMP  (LMP Unknown)   SpO2 100%   There is no height or weight on file to calculate BMI.  Physical Exam   GENERAL: alert and no distress  EYES: Eyes grossly normal to inspection, PERRL and conjunctivae and sclerae normal  PSYCH: mentation appears normal, affect normal/bright    Results for orders placed or performed in visit on 11/02/24 (from the past 24 hours)   Wet prep - Clinic Collect    Specimen:  Vagina; Swab   Result Value Ref Range    Trichomonas Present (A) Absent    Yeast Absent Absent    Clue Cells Absent Absent    WBCs/high power field 4+ (A) None   UA Macroscopic with reflex to Microscopic and Culture - Lab Collect    Specimen: Urine, Midstream   Result Value Ref Range    Color Urine Yellow Colorless, Straw, Light Yellow, Yellow    Appearance Urine Cloudy (A) Clear    Glucose Urine Negative Negative mg/dL    Bilirubin Urine Negative Negative    Ketones Urine Negative Negative mg/dL    Specific Gravity Urine 1.025 1.003 - 1.035    Blood Urine Negative Negative    pH Urine 6.5 5.0 - 7.0    Protein Albumin Urine 30 (A) Negative mg/dL    Urobilinogen Urine 0.2 0.2, 1.0 E.U./dL    Nitrite Urine Positive (A) Negative    Leukocyte Esterase Urine Moderate (A) Negative   Urine Microscopic Exam   Result Value Ref Range    Bacteria Urine Many (A) None Seen /HPF    RBC Urine 2-5 (A) 0-2 /HPF /HPF    WBC Urine 25-50 (A) 0-5 /HPF /HPF    Squamous Epithelials Urine Many (A) None Seen /LPF           Signed Electronically by: Tina Romero MD

## 2024-11-03 LAB — C TRACH DNA SPEC QL NAA+PROBE: NEGATIVE

## 2024-11-04 LAB — BACTERIA UR CULT: ABNORMAL

## 2025-03-22 ENCOUNTER — HEALTH MAINTENANCE LETTER (OUTPATIENT)
Age: 50
End: 2025-03-22